# Patient Record
Sex: FEMALE | Race: WHITE
[De-identification: names, ages, dates, MRNs, and addresses within clinical notes are randomized per-mention and may not be internally consistent; named-entity substitution may affect disease eponyms.]

---

## 2021-04-13 ENCOUNTER — HOSPITAL ENCOUNTER (EMERGENCY)
Dept: HOSPITAL 43 - DL.ED | Age: 51
Discharge: HOME | End: 2021-04-13
Payer: COMMERCIAL

## 2021-04-13 DIAGNOSIS — R11.2: ICD-10-CM

## 2021-04-13 DIAGNOSIS — E86.0: Primary | ICD-10-CM

## 2021-04-13 DIAGNOSIS — Z88.1: ICD-10-CM

## 2021-04-13 DIAGNOSIS — Z88.0: ICD-10-CM

## 2021-04-13 DIAGNOSIS — I10: ICD-10-CM

## 2021-04-13 DIAGNOSIS — Z79.899: ICD-10-CM

## 2021-04-13 LAB
ANION GAP SERPL CALC-SCNC: 14.6 MEQ/L (ref 7–13)
CHLORIDE SERPL-SCNC: 96 MMOL/L (ref 98–107)
SODIUM SERPL-SCNC: 135 MMOL/L (ref 136–145)

## 2021-04-13 NOTE — EDM.PDOC
<Juanjose Velarde M - Last Filed: 04/13/21 06:49>





ED HPI GENERAL MEDICAL PROBLEM





- General


Chief Complaint: Gastrointestinal Problem


Stated Complaint: NAUSEA


Time Seen by Provider: 04/13/21 06:35


Source of Information: Reports: Patient


History Limitations: Reports: No Limitations





- History of Present Illness


INITIAL COMMENTS - FREE TEXT/NARRATIVE: 





This 52 yo female patient reports to the ED due to having nausea all night long.

The patient reports she had neck fusion surgery last Friday and the 

anesthesiologist had placed a "small round patch" behind her ear. The patient 

was advised to remove the patch after 3 days (which was last night). The patient

repots she last took her pain medication last night at 2100 and has not been 

eating much due to difficulties swallowing.  


Onset Date: 04/12/21


Onset Time: 21:00


Duration: Constant


Location: Reports: Other


Quality: Reports: Other


Severity: Mild


Improves with: Reports: None


Worsens with: Reports: None


Context: Reports: Other


Associated Symptoms: Reports: Nausea/Vomiting


  ** Neck


Pain Score (Numeric/FACES): 7





- Related Data


                                    Allergies











Allergy/AdvReac Type Severity Reaction Status Date / Time


 


cephalexin Allergy  Rash Verified 04/13/21 06:28


 


Penicillins Allergy  Rash Verified 04/13/21 06:28











Home Meds: 


                                    Home Meds





Hydrocodone/Acetaminophen [Hydrocodone-Acetamin  mg] 1 each PO Q6HR PRN 

04/13/21 [History]


Losartan Potassium [Cozaar] 50 mg PO DAILY 04/13/21 [History]











Past Medical History





- Past Health History


Medical/Surgical History: Denies Medical/Surgical History


Cardiovascular History: Reports: Hypertension





- Past Surgical History


Musculoskeletal Surgical History: Reports: Other (See Below)


Other Musculoskeletal Surgeries/Procedures:: neck fusion





Social & Family History





- Tobacco Use


Tobacco Use Status *Q: Never Tobacco User


Second Hand Smoke Exposure: No





- Recreational Drug Use


Recreational Drug Use: No





ED ROS GENERAL





- Review of Systems


Review Of Systems: Comprehensive ROS is negative, except as noted in HPI.





ED EXAM, GI/ABD





- Physical Exam


Exam: See Below


Exam Limited By: No Limitations


General Appearance: Alert, WD/WN, Moderate Distress


Eyes: Bilateral: Normal Appearance, EOMI


Ears: Normal External Exam, Normal Canal, Hearing Grossly Normal, Normal TMs


Nose: Normal Inspection, Normal Mucosa, No Blood


Throat/Mouth: Normal Inspection, Normal Lips, Normal Teeth, Normal Gums, Normal 

Oropharynx, Normal Voice, No Airway Compromise


Head: Atraumatic, Normocephalic


Neck: Normal Inspection, Supple, Non-Tender, Full Range of Motion


Respiratory/Chest: No Respiratory Distress, Lungs Clear, Normal Breath Sounds, 

No Accessory Muscle Use, Chest Non-Tender


Cardiovascular: Normal Peripheral Pulses, Regular Rate, Rhythm, No Edema, No 

Gallop, No JVD, No Murmur, No Rub


GI/Abdominal Exam: Normal Bowel Sounds, Soft, Non-Tender, No Organomegaly, No 

Distention, No Abnormal Bruit, No Mass, Pelvis Stable


 (Female) Exam: Deferred


Rectal (Female) Exam: Deferred


Back Exam: Normal Inspection, Full Range of Motion, NT


Extremities: Normal Inspection, Normal Range of Motion, Non-Tender, Normal 

Capillary Refill, No Pedal Edema


Neurological: Alert, Oriented, CN II-XII Intact, Normal Cognition, Normal Gait, 

Normal Reflexes, No Motor/Sensory Deficits


Psychiatric: Normal Affect, Normal Mood


Skin Exam: Warm, Dry, Intact, Normal Color, No Rash


Lymphatic: No Adenopathy





Departure





- Departure


Disposition: Home, Self-Care 01


Clinical Impression: 


 Dehydration, Nausea








- Discharge Information


Instructions:  Nausea and Vomiting, Adult, Easy-to-Read, Dehydration, Adult, 

Easy-to-Read


Forms:  ED Department Discharge


Additional Instructions: 


RX: Ondanestron every 4-6 hours as needed for nausea


Drink plenty of fluids


Follow up with your surgeon for further recommendations


Follow up with your primary care facility








Sepsis Event Note (ED)





- Evaluation


Sepsis Screening Result: No Definite Risk





<Marianna Arora - Last Filed: 04/13/21 07:59>





Course





- Vital Signs


Last Recorded V/S: 





                                Last Vital Signs











Temp  97.4 F   04/13/21 07:36


 


Pulse  96   04/13/21 07:36


 


Resp  16   04/13/21 07:36


 


BP  157/68 H  04/13/21 07:36


 


Pulse Ox  98   04/13/21 07:36














- Orders/Labs/Meds


Orders: 





                               Active Orders 24 hr











 Category Date Time Status


 


 Sodium Chloride 0.9% [Normal Saline] 1,000 ml Med  04/13/21 07:15 Active





 IV .BOLUS   








                                Medication Orders





Sodium Chloride (Normal Saline)  1,000 mls @ 999 mls/hr IV .BOLUS ONE


   Stop: 04/13/21 08:15


   Last Admin: 04/13/21 07:21  Dose: 999 mls/hr


   Documented by: DIVYA








Labs: 





                                Laboratory Tests











  04/13/21 04/13/21 Range/Units





  06:30 06:30 


 


WBC  13.2 H   (5.0-10.0)  10^3/uL


 


RBC  5.53 H   (4.2-5.4)  10^6/uL


 


Hgb  16.8 H   (12.0-16.0)  g/dL


 


Hct  47.3 H   (37.0-47.0)  %


 


MCV  85.5   ()  fL


 


MCH  30.4   (27.0-34.0)  pg


 


MCHC  35.5 H   (33.0-35.0)  g/dL


 


Plt Count  246   (150-450)  10^3/uL


 


Neut % (Auto)  83.2 H   (42.2-75.2)  %


 


Lymph % (Auto)  12.0 L   (20.5-50.1)  %


 


Mono % (Auto)  4.2   (2-8)  %


 


Eos % (Auto)  0.2 L   (1.0-3.0)  %


 


Baso % (Auto)  0.4   (0.0-1.0)  %


 


Sodium   135 L  (136-145)  mmol/L


 


Potassium   3.6  (3.5-5.1)  mmol/L


 


Chloride   96 L  ()  mmol/L


 


Carbon Dioxide   28  (21-32)  mmol/L


 


Anion Gap   14.6 H  (7-13)  mEq/L


 


BUN   11  (7-18)  mg/dL


 


Creatinine   1.03 H  (0.55-1.02)  mg/dL


 


Est Cr Clr Drug Dosing   55.80  mL/min


 


Estimated GFR (MDRD)   56  


 


BUN/Creatinine Ratio   10.7  (No establ ref range)  


 


Glucose   116 H  (70-99)  mg/dL


 


Calcium   9.2  (8.5-10.1)  mg/dL


 


Total Bilirubin   1.0  (0.2-1.0)  mg/dL


 


AST   27  (15-37)  U/L


 


ALT   63 H  (14-59)  U/L


 


Alkaline Phosphatase   76  ()  U/L


 


Total Protein   7.9  (6.4-8.2)  g/dL


 


Albumin   3.9  (3.4-5.0)  g/dL


 


Globulin   4.0  


 


Albumin/Globulin Ratio   1.0  











Meds: 





Medications











Generic Name Dose Route Start Last Admin





  Trade Name Aftab  PRN Reason Stop Dose Admin


 


Sodium Chloride  1,000 mls @ 999 mls/hr  04/13/21 07:15  04/13/21 07:21





  Normal Saline  IV  04/13/21 08:15  999 mls/hr





  .BOLUS ONE   Administration














Discontinued Medications














Generic Name Dose Route Start Last Admin





  Trade Name fAtab  PRN Reason Stop Dose Admin


 


Morphine Sulfate  2 mg  04/13/21 07:15  04/13/21 07:21





  Morphine 2 Mg/Ml Syringe  IVPUSH  04/13/21 07:16  2 mg





  ONETIME ONE   Administration


 


Ondansetron HCl  4 mg  04/13/21 06:38  04/13/21 06:42





  Ondansetron 4 Mg/2 Ml Sdv  IVPUSH  04/13/21 06:39  4 mg





  ONETIME ONE   Administration














Departure





- Departure


Time of Disposition: 07:50


Condition: Good





- Discharge Information


*PRESCRIPTION DRUG MONITORING PROGRAM REVIEWED*: No


*COPY OF PRESCRIPTION DRUG MONITORING REPORT IN PATIENT [place]: No





Sepsis Event Note (ED)





- Focused Exam


Vital Signs: 





                                   Vital Signs











  Temp Pulse Resp BP Pulse Ox


 


 04/13/21 07:36  97.4 F  96  16  157/68 H  98


 


 04/13/21 06:23  98.1 F  95  26 H  143/76 H  99














- My Orders


Last 24 Hours: 





My Active Orders





04/13/21 07:15


Sodium Chloride 0.9% [Normal Saline] 1,000 ml IV .BOLUS 














- Assessment/Plan


Last 24 Hours: 





My Active Orders





04/13/21 07:15


Sodium Chloride 0.9% [Normal Saline] 1,000 ml IV .BOLUS